# Patient Record
(demographics unavailable — no encounter records)

---

## 2017-03-28 NOTE — RAD
CHEST TWO VIEWS

3/28/17

 

Comparison is made with several prior studies dating back to 8/13/14. 

 

The heart size is stable and shows no prominent cardiomegaly. There is no congestive change or pleur
al effusion. The lungs are clear with no acute infiltrate present. Regarding the subcentimeter nodul
ar density in the right upper lobe, it has not changed at all in the last three years.

 

IMPRESSION:  

No acute thoracic finding. 

 

POS: HOME

## 2018-07-12 NOTE — RAD
CHEST TWO VIEWS:

7/12/18

 

Comparison is made with numerous prior studies dating back to 8/13/14. 

 

There has been no adverse change over time. The heart size remains normal. There are no congestive fi
ndings or pleural effusions. Median sternotomy sutures are seen from prior surgery and arteriosclerot
ic change is present in the aortic arch. Some lingular scarring is suggested. A vague nodular area in
 the right upper lobe remains unchanged over the four year period. 

 

IMPRESSION:  

No acute thoracic finding. 

 

POS: HOME

## 2018-10-03 NOTE — ULT
SONOGRAM ABDOMEN COMPLETE:

 

History: Upper abdomen pain. 

 

FINDINGS: 

Gallbladder is surgically absent. Common duct 0.8 mm. Liver unremarkable without focal mass or intrah
epatic biliary dilation. No free fluid. The spleen, kidneys, and visualized portions of the abdominal
 aorta, IVC, and pancreas are unremarkable. 

 

IMPRESSION: 

Status post cholecystectomy. No significant abnormalities are demonstrated. 

 

POS: [facility]

## 2019-05-31 NOTE — NM
Exam: 

NUCLEAR MEDICINE PARATHYROID SCAN:



HISTORY: Primary hyperparathyroidism.



COMPARISON: None. 



TECHNIQUE: Patient was administered 25.9 mCi of technetium 99m sestamibi intravenously. Planar and SP
ECT imaging performed. 



FINDINGS:

Appropriate distribution and uptake of the radiotracer. No evidence of radiotracer in the thyroid bed
 on the 2 hour images. SPECT images do not demonstrate any evidence of radiotracer retention on the

1 hour image suggest a parathyroid adenoma.



IMPRESSION:

No scintigraphic evidence of parathyroid adenoma.



RECOMMENDATION:

Pre and postcontrast soft tissue neck CT utilizing parathyroid adenoma protocol.



Transcribed Date/Time: 5/31/2019 12:12 PM



Reported By: Tawanna Mayo 

Electronically Signed:  5/31/2019 12:21 PM

## 2020-02-07 NOTE — RAD
RIGHT KNEE TWO VIEWS:

2/7/20

 

No fracture was seen. Minimal bony spurring in seen medially. A joint effusion is probably present. C
alcification is seen in the popliteal region. 

 

IMPRESSION: 

Joint effusion but no acute bony finding. 

 

POS: HOME